# Patient Record
Sex: MALE | Race: WHITE | NOT HISPANIC OR LATINO | Employment: OTHER | URBAN - METROPOLITAN AREA
[De-identification: names, ages, dates, MRNs, and addresses within clinical notes are randomized per-mention and may not be internally consistent; named-entity substitution may affect disease eponyms.]

---

## 2022-04-10 ENCOUNTER — HISTORICAL (OUTPATIENT)
Dept: ADMINISTRATIVE | Facility: HOSPITAL | Age: 52
End: 2022-04-10
Payer: OTHER GOVERNMENT

## 2022-04-30 VITALS
BODY MASS INDEX: 32.49 KG/M2 | SYSTOLIC BLOOD PRESSURE: 132 MMHG | WEIGHT: 195 LBS | HEIGHT: 65 IN | DIASTOLIC BLOOD PRESSURE: 84 MMHG

## 2022-05-20 DIAGNOSIS — R56.9 SEIZURE: Primary | ICD-10-CM

## 2022-06-21 RX ORDER — LISINOPRIL 40 MG/1
40 TABLET ORAL EVERY MORNING
COMMUNITY

## 2022-06-21 RX ORDER — LAMOTRIGINE 100 MG/1
100 TABLET ORAL DAILY
COMMUNITY
End: 2022-09-08

## 2022-06-21 RX ORDER — TERBINAFINE HYDROCHLORIDE 250 MG/1
250 TABLET ORAL DAILY
COMMUNITY
End: 2022-09-08

## 2022-06-21 RX ORDER — LAMOTRIGINE 200 MG/1
200 TABLET ORAL 2 TIMES DAILY
COMMUNITY
End: 2023-06-01 | Stop reason: SDUPTHER

## 2022-06-21 RX ORDER — SILDENAFIL 100 MG/1
100 TABLET, FILM COATED ORAL DAILY PRN
COMMUNITY
End: 2022-09-08

## 2022-06-21 RX ORDER — LAMOTRIGINE 25 MG/1
25 TABLET ORAL DAILY
COMMUNITY
End: 2022-09-08

## 2022-06-21 RX ORDER — TADALAFIL 20 MG/1
20 TABLET ORAL DAILY
COMMUNITY
End: 2022-09-08

## 2022-06-21 RX ORDER — SIMVASTATIN 80 MG/1
80 TABLET, FILM COATED ORAL NIGHTLY
COMMUNITY

## 2022-06-22 ENCOUNTER — ANESTHESIA EVENT (OUTPATIENT)
Dept: ENDOSCOPY | Facility: HOSPITAL | Age: 52
End: 2022-06-22
Payer: OTHER GOVERNMENT

## 2022-06-22 ENCOUNTER — ANESTHESIA (OUTPATIENT)
Dept: ENDOSCOPY | Facility: HOSPITAL | Age: 52
End: 2022-06-22
Payer: OTHER GOVERNMENT

## 2022-06-22 ENCOUNTER — HOSPITAL ENCOUNTER (OUTPATIENT)
Facility: HOSPITAL | Age: 52
Discharge: HOME | End: 2022-06-22
Attending: INTERNAL MEDICINE | Admitting: INTERNAL MEDICINE
Payer: OTHER GOVERNMENT

## 2022-06-22 VITALS
TEMPERATURE: 98 F | SYSTOLIC BLOOD PRESSURE: 140 MMHG | BODY MASS INDEX: 31.34 KG/M2 | RESPIRATION RATE: 22 BRPM | HEART RATE: 65 BPM | DIASTOLIC BLOOD PRESSURE: 99 MMHG | HEIGHT: 66 IN | WEIGHT: 195 LBS | OXYGEN SATURATION: 96 %

## 2022-06-22 DIAGNOSIS — R19.5 FECAL OCCULT BLOOD TEST POSITIVE: ICD-10-CM

## 2022-06-22 PROCEDURE — 00811 ANES LWR INTST NDSC NOS: CPT | Performed by: INTERNAL MEDICINE

## 2022-06-22 PROCEDURE — 37000008 HC ANESTHESIA 1ST 15 MINUTES: Performed by: INTERNAL MEDICINE

## 2022-06-22 PROCEDURE — 37000009 HC ANESTHESIA EA ADD 15 MINS: Performed by: INTERNAL MEDICINE

## 2022-06-22 PROCEDURE — 63600175 PHARM REV CODE 636 W HCPCS: Performed by: NURSE ANESTHETIST, CERTIFIED REGISTERED

## 2022-06-22 PROCEDURE — 25000003 PHARM REV CODE 250: Performed by: NURSE ANESTHETIST, CERTIFIED REGISTERED

## 2022-06-22 PROCEDURE — 45385 COLONOSCOPY W/LESION REMOVAL: CPT | Performed by: INTERNAL MEDICINE

## 2022-06-22 PROCEDURE — 27201423 OPTIME MED/SURG SUP & DEVICES STERILE SUPPLY: Performed by: INTERNAL MEDICINE

## 2022-06-22 RX ORDER — ONDANSETRON 2 MG/ML
4 INJECTION INTRAMUSCULAR; INTRAVENOUS DAILY PRN
Status: DISCONTINUED | OUTPATIENT
Start: 2022-06-22 | End: 2022-06-22 | Stop reason: HOSPADM

## 2022-06-22 RX ORDER — PROPOFOL 10 MG/ML
VIAL (ML) INTRAVENOUS
Status: DISCONTINUED | OUTPATIENT
Start: 2022-06-22 | End: 2022-06-22

## 2022-06-22 RX ORDER — PROPOFOL 10 MG/ML
VIAL (ML) INTRAVENOUS
Status: COMPLETED
Start: 2022-06-22 | End: 2022-06-22

## 2022-06-22 RX ORDER — SODIUM CHLORIDE 0.9 % (FLUSH) 0.9 %
10 SYRINGE (ML) INJECTION
Status: CANCELLED | OUTPATIENT
Start: 2022-06-22

## 2022-06-22 RX ORDER — FAMOTIDINE 20 MG/1
40 TABLET, FILM COATED ORAL NIGHTLY
COMMUNITY

## 2022-06-22 RX ORDER — SODIUM CHLORIDE, SODIUM GLUCONATE, SODIUM ACETATE, POTASSIUM CHLORIDE AND MAGNESIUM CHLORIDE 30; 37; 368; 526; 502 MG/100ML; MG/100ML; MG/100ML; MG/100ML; MG/100ML
1000 INJECTION, SOLUTION INTRAVENOUS CONTINUOUS
Status: CANCELLED | OUTPATIENT
Start: 2022-06-22 | End: 2022-07-22

## 2022-06-22 RX ORDER — SODIUM CHLORIDE, SODIUM GLUCONATE, SODIUM ACETATE, POTASSIUM CHLORIDE AND MAGNESIUM CHLORIDE 30; 37; 368; 526; 502 MG/100ML; MG/100ML; MG/100ML; MG/100ML; MG/100ML
1000 INJECTION, SOLUTION INTRAVENOUS CONTINUOUS
Status: DISCONTINUED | OUTPATIENT
Start: 2022-06-22 | End: 2022-06-22 | Stop reason: HOSPADM

## 2022-06-22 RX ADMIN — SODIUM CHLORIDE, SODIUM GLUCONATE, SODIUM ACETATE, POTASSIUM CHLORIDE AND MAGNESIUM CHLORIDE: 526; 502; 368; 37; 30 INJECTION, SOLUTION INTRAVENOUS at 02:06

## 2022-06-22 RX ADMIN — PROPOFOL 240 MG: 10 INJECTION, EMULSION INTRAVENOUS at 02:06

## 2022-06-22 RX ADMIN — PROPOFOL 100 MG: 10 INJECTION, EMULSION INTRAVENOUS at 02:06

## 2022-06-22 NOTE — H&P
[facility] Endoscopy  Gastroenterology  H&P    Patient Name: Sebas Garcia  MRN: [id number]  Admission Date: 6/22/2022  Code Status: No Order    Attending Provider: Moisés Benitez MD   Primary Care Physician: Primary Doctor No  Principal Problem:<principal problem not specified>    Subjective:     History of Present Illness: Here for outpatient colonoscopy for positive fecal occult blood testing.     Past Medical History:   Diagnosis Date    Alcohol abuse     Dysphagia     High cholesterol     Hypertension     Obesity, unspecified     Seizures        Past Surgical History:   Procedure Laterality Date    APPENDECTOMY      CLAVICLE SURGERY      CRANIOTOMY      OPEN REDUCTION AND INTERNAL FIXATION (ORIF) OF INJURY OF THUMB         Review of patient's allergies indicates:  No Known Allergies  Family History    None       Tobacco Use    Smoking status: Never Smoker    Smokeless tobacco: Never Used   Substance and Sexual Activity    Alcohol use: Yes    Drug use: Never    Sexual activity: Not on file     Review of Systems   Constitutional: Negative for chills and fever.   HENT: Negative for mouth sores.    Eyes: Negative for visual disturbance.   Respiratory: Negative for cough and shortness of breath.    Cardiovascular: Negative for chest pain.   Gastrointestinal: Negative for abdominal pain, constipation, diarrhea, nausea and vomiting.   Endocrine: Negative for cold intolerance and heat intolerance.   Genitourinary: Negative for frequency and urgency.   Musculoskeletal: Negative for back pain.   Skin: Negative for rash.   Neurological: Negative for headaches.   Psychiatric/Behavioral: Negative for agitation and behavioral problems.     Objective:     Vital Signs (Most Recent):  Temp: 97.2 °F (36.2 °C) (06/22/22 1350)  Pulse: 65 (06/22/22 1350)  Resp: 13 (06/22/22 1350)  BP: (!) 139/94 (06/22/22 1350)  SpO2: 96 % (06/22/22 1350) Vital Signs (24h Range):  Temp:  [97.2 °F (36.2 °C)]  97.2 °F (36.2 °C)  Pulse:  [65] 65  Resp:  [13] 13  SpO2:  [96 %] 96 %  BP: (139)/(94) 139/94     Weight: 88.5 kg (195 lb) (06/21/22 1539)  Body mass index is 31.47 kg/m².    No intake or output data in the 24 hours ending 06/22/22 1435    Lines/Drains/Airways     Peripheral Intravenous Line  Duration                Peripheral IV - Single Lumen 06/22/22 1348 22 G Left Antecubital <1 day                Physical Exam  Constitutional:       General: He is not in acute distress.     Appearance: He is well-developed. He is not diaphoretic.   HENT:      Head: Normocephalic and atraumatic.      Right Ear: External ear normal.      Left Ear: External ear normal.      Nose: Nose normal.   Eyes:      General: No scleral icterus.        Right eye: No discharge.         Left eye: No discharge.      Conjunctiva/sclera: Conjunctivae normal.   Cardiovascular:      Rate and Rhythm: Normal rate and regular rhythm.      Heart sounds: No murmur heard.    No friction rub.   Pulmonary:      Effort: Pulmonary effort is normal. No respiratory distress.      Breath sounds: Normal breath sounds. No wheezing.   Abdominal:      General: Bowel sounds are normal. There is no distension.      Palpations: Abdomen is soft.      Tenderness: There is no abdominal tenderness.   Musculoskeletal:         General: No deformity.      Cervical back: Normal range of motion and neck supple.   Skin:     General: Skin is warm.   Neurological:      Mental Status: He is alert and oriented to person, place, and time.   Psychiatric:         Behavior: Behavior normal.         Significant Labs:  None    Significant Imaging:  Imaging results within the past 24 hours have been reviewed.    Assessment/Plan:     There are no hospital problems to display for this patient.    Procedure risks and benefits were discussed in detail today and an opportunity to answer patient/family questions was provided.      Moisés Benitez MD  Gastroenterology  [facility]  - [facility] Endoscopy

## 2022-06-22 NOTE — TRANSFER OF CARE
"Anesthesia Transfer of Care Note    Patient: Sebas Garcia    Procedure(s) Performed: Procedure(s) (LRB):  COLON (N/A)    Patient location: PACU    Anesthesia Type: general    Transport from OR: Transported from OR on room air with adequate spontaneous ventilation    Post pain: adequate analgesia    Post assessment: no apparent anesthetic complications    Post vital signs: stable    Level of consciousness: responds to stimulation    Nausea/Vomiting: no nausea/vomiting    Complications: none    Transfer of care protocol was followed      Last vitals:   Visit Vitals  BP (!) 139/94 (BP Location: Right arm, Patient Position: Lying)   Pulse 65   Temp 36.2 °C (97.2 °F)   Resp 13   Ht 5' 6" (1.676 m)   Wt 88.5 kg (195 lb)   SpO2 96%   BMI 31.47 kg/m²     "

## 2022-06-22 NOTE — ANESTHESIA PREPROCEDURE EVALUATION
06/22/2022  Sebas Garcia is a 52 y.o., male with Past Medical History:  Alcohol abuse  Dysphagia  High cholesterol  Hypertension  Obesity, unspecified  Seizures    And Past Surgical History:  Appendectomy  Clavicle surgery  Craniotomy  Open reduction and internal fixation (orif) of injury of thumb    Here for colonoscopy due to age and fecal occult blood test postive.      Pre-op Assessment    I have reviewed the NPO Status.      Review of Systems      Physical Exam  General: Well nourished, Cooperative, Alert and Oriented    Airway:  Mallampati: II   Mouth Opening: Normal  TM Distance: Normal  Tongue: Normal  Neck ROM: Normal ROM    Dental:  Intact    Chest/Lungs:  Clear to auscultation, Normal Respiratory Rate    Heart:  Rate: Normal  Rhythm: Regular Rhythm        Anesthesia Plan  Type of Anesthesia, risks & benefits discussed:    Anesthesia Type: Gen Natural Airway  Intra-op Monitoring Plan: Standard ASA Monitors  Post Op Pain Control Plan: IV/PO Opioids PRN  Induction:  IV  Airway Plan: Direct  Informed Consent: Informed consent signed with the Patient and all parties understand the risks and agree with anesthesia plan.  All questions answered. Patient consented to blood products? No  ASA Score: 3  Day of Surgery Review of History & Physical: H&P Update referred to the surgeon/provider.  Anesthesia Plan Notes: Nasal cannula vs facemask supplemental oxygenation   For patients with MICHELLE/obesity, may consider SuperNoval Nasal CPAP      Ready For Surgery From Anesthesia Perspective.     .

## 2022-06-22 NOTE — PROVATION PATIENT INSTRUCTIONS
Discharge Summary/Instructions after an Endoscopic Procedure  Patient Name: Sebas Garcia  Patient MRN: [id number]  Patient YOB: 1970 Wednesday, June 22, 2022  Moisés Benitez MD  Dear patient,  As a result of recent federal legislation (The Federal Cures Act), you may   receive lab or pathology results from your procedure in your [facility]   account before your physician is able to contact you. Your physician or   their representative will relay the results to you with their   recommendations at their soonest availability.  Thank you,  RESTRICTIONS:  During your procedure today, you received medications for sedation.  These   medications may affect your judgment, balance and coordination.  Therefore,   for 24 hours, you have the following restrictions:   - DO NOT drive a car, operate machinery, make legal/financial decisions,   sign important papers or drink alcohol.    ACTIVITY:  Today: no heavy lifting, straining or running due to procedural   sedation/anesthesia.  The following day: return to full activity including work.  DIET:  Eat and drink normally unless instructed otherwise.     TREATMENT FOR COMMON SIDE EFFECTS:  - Mild abdominal pain, nausea, belching, bloating or excessive gas:  rest,   eat lightly and use a heating pad.  - Sore Throat: treat with throat lozenges and/or gargle with warm salt   water.  - Because air was used during the procedure, expelling large amounts of air   from your rectum or belching is normal.  - If a bowel prep was taken, you may not have a bowel movement for 1-3 days.    This is normal.  SYMPTOMS TO WATCH FOR AND REPORT TO YOUR PHYSICIAN:  1. Abdominal pain or bloating, other than gas cramps.  2. Chest pain.  3. Back pain.  4. Signs of infection such as: chills or fever occurring within 24 hours   after the procedure.  5. Rectal bleeding, which would show as bright red, maroon, or black stools.   (A tablespoon of blood from the rectum is not serious, especially  if   hemorrhoids are present.)  6. Vomiting.  7. Weakness or dizziness.  GO DIRECTLY TO THE NEAREST EMERGENCY ROOM IF YOU HAVE ANY OF THE FOLLOWING:      Difficulty breathing              Chills and/or fever over 101 F   Persistent vomiting and/or vomiting blood   Severe abdominal pain   Severe chest pain   Black, tarry stools   Bleeding- more than one tablespoon   Any other symptom or condition that you feel may need urgent attention  Your doctor recommends these additional instructions:  If any biopsies were taken, your doctors clinic will contact you in 1 to 2   weeks with any results.  - Patient has a contact number available for emergencies.  The signs and   symptoms of potential delayed complications were discussed with the   patient.  Return to normal activities tomorrow.  Written discharge   instructions were provided to the patient.   - Advance diet as tolerated today.   - Discharge patient to home (with escort).   - Await pathology results.   - Repeat colonoscopy in 5 years for surveillance.   - Telephone GI clinic for pathology results in 2 weeks.   - The findings and recommendations were discussed with the patient.  For questions, problems or results please call your physician - Moisés Benitez MD at Work:  [phone].  [facility], EMERGENCY ROOM PHONE NUMBER: [phone]  IF A COMPLICATION OR EMERGENCY SITUATION ARISES AND YOU ARE UNABLE TO REACH   YOUR PHYSICIAN - GO DIRECTLY TO THE EMERGENCY ROOM.  MD Moisés Nova MD  6/22/2022 3:10:12 PM  This report has been verified and signed electronically.  Dear patient,  As a result of recent federal legislation (The Federal Cures Act), you may   receive lab or pathology results from your procedure in your [facility]   account before your physician is able to contact you. Your physician or   their representative will relay the results to you with their   recommendations at their soonest availability.  Thank  you,  PROVATION

## 2022-06-23 NOTE — ANESTHESIA POSTPROCEDURE EVALUATION
Anesthesia Post Evaluation    Patient: Sebas Garcia    Procedure(s) Performed: Procedure(s) (LRB):  COLON (N/A)    Final Anesthesia Type: general      Patient location during evaluation: PACU  Patient participation: Yes- Able to Participate  Level of consciousness: awake and alert  Post-procedure vital signs: reviewed and stable  Pain management: adequate    PONV status at discharge: No PONV  Anesthetic complications: no      Cardiovascular status: hemodynamically stable  Respiratory status: unassisted and room air  Hydration status: euvolemic  Follow-up not needed.          Vitals Value Taken Time   /99 06/22/22 1540   Temp 36.5 °C (97.7 °F) 06/22/22 1521   Pulse 65 06/22/22 1540   Resp 22 06/22/22 1540   SpO2 96 % 06/22/22 1540         No case tracking events are documented in the log.      Pain/Vaibhav Score: Vaibhav Score: 10 (6/22/2022  3:41 PM)

## 2022-06-24 LAB
ESTROGEN SERPL-MCNC: NORMAL PG/ML
INSULIN SERPL-ACNC: NORMAL U[IU]/ML
LAB AP CLINICAL INFORMATION: NORMAL
LAB AP GROSS DESCRIPTION: NORMAL
LAB AP REPORT FOOTNOTES: NORMAL
T3RU NFR SERPL: NORMAL %

## 2022-09-08 ENCOUNTER — OFFICE VISIT (OUTPATIENT)
Dept: NEUROLOGY | Facility: CLINIC | Age: 52
End: 2022-09-08
Payer: OTHER GOVERNMENT

## 2022-09-08 VITALS
HEIGHT: 66 IN | WEIGHT: 195 LBS | BODY MASS INDEX: 31.34 KG/M2 | DIASTOLIC BLOOD PRESSURE: 92 MMHG | SYSTOLIC BLOOD PRESSURE: 132 MMHG

## 2022-09-08 DIAGNOSIS — G40.009 LOCALIZATION-RELATED (FOCAL) (PARTIAL) IDIOPATHIC EPILEPSY AND EPILEPTIC SYNDROMES WITH SEIZURES OF LOCALIZED ONSET, NOT INTRACTABLE, WITHOUT STATUS EPILEPTICUS: Primary | ICD-10-CM

## 2022-09-08 DIAGNOSIS — R55: ICD-10-CM

## 2022-09-08 DIAGNOSIS — S06.9X9D TRAUMATIC BRAIN INJURY WITH LOSS OF CONSCIOUSNESS, SUBSEQUENT ENCOUNTER: ICD-10-CM

## 2022-09-08 DIAGNOSIS — R56.9 SEIZURE: ICD-10-CM

## 2022-09-08 DIAGNOSIS — M54.12 CERVICAL RADICULOPATHY: ICD-10-CM

## 2022-09-08 PROBLEM — S06.9X9A TRAUMATIC BRAIN INJURY WITH LOSS OF CONSCIOUSNESS: Status: ACTIVE | Noted: 2022-09-08

## 2022-09-08 PROCEDURE — 99215 PR OFFICE/OUTPT VISIT, EST, LEVL V, 40-54 MIN: ICD-10-PCS | Mod: S$PBB,,, | Performed by: SPECIALIST

## 2022-09-08 PROCEDURE — 99215 OFFICE O/P EST HI 40 MIN: CPT | Mod: S$PBB,,, | Performed by: SPECIALIST

## 2022-09-08 PROCEDURE — 99999 PR PBB SHADOW E&M-EST. PATIENT-LVL III: ICD-10-PCS | Mod: PBBFAC,,, | Performed by: SPECIALIST

## 2022-09-08 PROCEDURE — 99213 OFFICE O/P EST LOW 20 MIN: CPT | Mod: PBBFAC | Performed by: SPECIALIST

## 2022-09-08 PROCEDURE — 99999 PR PBB SHADOW E&M-EST. PATIENT-LVL III: CPT | Mod: PBBFAC,,, | Performed by: SPECIALIST
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
## 2022-09-09 ENCOUNTER — LAB VISIT (OUTPATIENT)
Dept: LAB | Facility: HOSPITAL | Age: 52
End: 2022-09-09
Attending: SPECIALIST
Payer: OTHER GOVERNMENT

## 2022-09-09 DIAGNOSIS — S06.9X9D TRAUMATIC BRAIN INJURY WITH LOSS OF CONSCIOUSNESS, SUBSEQUENT ENCOUNTER: ICD-10-CM

## 2022-09-09 DIAGNOSIS — R56.9 SEIZURE: ICD-10-CM

## 2022-09-09 DIAGNOSIS — R55: ICD-10-CM

## 2022-09-09 DIAGNOSIS — G40.009 LOCALIZATION-RELATED (FOCAL) (PARTIAL) IDIOPATHIC EPILEPSY AND EPILEPTIC SYNDROMES WITH SEIZURES OF LOCALIZED ONSET, NOT INTRACTABLE, WITHOUT STATUS EPILEPTICUS: ICD-10-CM

## 2022-09-09 PROCEDURE — 36415 COLL VENOUS BLD VENIPUNCTURE: CPT

## 2022-09-09 PROCEDURE — 80175 DRUG SCREEN QUAN LAMOTRIGINE: CPT

## 2022-09-10 LAB — LAMOTRIGINE SERPL-MCNC: 7.1 MCG/ML (ref 2.5–15)

## 2022-09-12 ENCOUNTER — TELEPHONE (OUTPATIENT)
Dept: NEUROLOGY | Facility: CLINIC | Age: 52
End: 2022-09-12
Payer: OTHER GOVERNMENT

## 2022-09-12 NOTE — TELEPHONE ENCOUNTER
----- Message from Connor Troncoso MD sent at 9/12/2022  9:19 AM CDT -----  Please call patient with attached result(s). Lamictal blood level  good  no new orders

## 2022-12-28 ENCOUNTER — ANESTHESIA EVENT (OUTPATIENT)
Dept: SURGERY | Facility: HOSPITAL | Age: 52
End: 2022-12-28
Payer: OTHER GOVERNMENT

## 2022-12-28 NOTE — ANESTHESIA PREPROCEDURE EVALUATION
12/28/2022  Sebas Garcia is a 52 y.o., male.      Pre-op Assessment    I have reviewed the Patient Summary Reports.     I have reviewed the Nursing Notes. I have reviewed the NPO Status.   I have reviewed the Medications.     Review of Systems  Anesthesia Hx:  Denies Family Hx of Anesthesia complications.   Denies Personal Hx of Anesthesia complications.   Social:  Non-Smoker, Alcohol Use    Hematology/Oncology:  Hematology Normal   Oncology Normal     EENT/Dental:EENT/Dental Normal   Cardiovascular:   Hypertension, well controlled    Pulmonary:  Pulmonary Normal    Hepatic/GI:  Hepatic/GI Normal    Musculoskeletal:  Musculoskeletal Normal    Neurological:   Neuromuscular Disease, Seizures, well controlled    Endocrine:  Endocrine Normal    Dermatological:  Skin Normal    Psych:  Psychiatric Normal           Physical Exam  General: Cooperative, Alert and Oriented    Airway:  Mallampati: II   Mouth Opening: Normal  TM Distance: Normal  Tongue: Normal  Neck ROM: Normal ROM    Dental:  Intact        Anesthesia Plan  Type of Anesthesia, risks & benefits discussed:    Anesthesia Type: MAC  Intra-op Monitoring Plan: Standard ASA Monitors  Post Op Pain Control Plan: multimodal analgesia  Induction:  IV  Informed Consent: Informed consent signed with the Patient and all parties understand the risks and agree with anesthesia plan.  All questions answered. Patient consented to blood products? Yes  ASA Score: 3    Ready For Surgery From Anesthesia Perspective.     .

## 2022-12-29 RX ORDER — GABAPENTIN 100 MG/1
100 CAPSULE ORAL 2 TIMES DAILY
COMMUNITY
End: 2024-03-01

## 2022-12-30 ENCOUNTER — ANESTHESIA (OUTPATIENT)
Dept: SURGERY | Facility: HOSPITAL | Age: 52
End: 2022-12-30
Payer: OTHER GOVERNMENT

## 2022-12-30 ENCOUNTER — HOSPITAL ENCOUNTER (OUTPATIENT)
Facility: HOSPITAL | Age: 52
Discharge: HOME | End: 2022-12-30
Attending: ANESTHESIOLOGY | Admitting: ANESTHESIOLOGY
Payer: OTHER GOVERNMENT

## 2022-12-30 VITALS
HEART RATE: 66 BPM | BODY MASS INDEX: 31.36 KG/M2 | HEIGHT: 66 IN | TEMPERATURE: 98 F | DIASTOLIC BLOOD PRESSURE: 91 MMHG | WEIGHT: 195.13 LBS | OXYGEN SATURATION: 98 % | SYSTOLIC BLOOD PRESSURE: 152 MMHG

## 2022-12-30 DIAGNOSIS — M54.16 LUMBAR RADICULITIS: ICD-10-CM

## 2022-12-30 PROCEDURE — 62323 NJX INTERLAMINAR LMBR/SAC: CPT | Performed by: ANESTHESIOLOGY

## 2022-12-30 PROCEDURE — 37000008 HC ANESTHESIA 1ST 15 MINUTES: Performed by: ANESTHESIOLOGY

## 2022-12-30 PROCEDURE — 01992 ANES DX/THER NRV BLK&INJ PRN: CPT | Performed by: ANESTHESIOLOGY

## 2022-12-30 PROCEDURE — 63600175 PHARM REV CODE 636 W HCPCS: Performed by: ANESTHESIOLOGY

## 2022-12-30 PROCEDURE — 25000003 PHARM REV CODE 250: Performed by: ANESTHESIOLOGY

## 2022-12-30 PROCEDURE — 63600175 PHARM REV CODE 636 W HCPCS: Performed by: NURSE ANESTHETIST, CERTIFIED REGISTERED

## 2022-12-30 PROCEDURE — 25500020 PHARM REV CODE 255: Performed by: ANESTHESIOLOGY

## 2022-12-30 RX ORDER — METHYLPREDNISOLONE ACETATE 80 MG/ML
INJECTION, SUSPENSION INTRA-ARTICULAR; INTRALESIONAL; INTRAMUSCULAR; SOFT TISSUE
Status: DISCONTINUED | OUTPATIENT
Start: 2022-12-30 | End: 2022-12-30 | Stop reason: HOSPADM

## 2022-12-30 RX ORDER — BUPIVACAINE HYDROCHLORIDE 2.5 MG/ML
INJECTION, SOLUTION EPIDURAL; INFILTRATION; INTRACAUDAL
Status: DISCONTINUED | OUTPATIENT
Start: 2022-12-30 | End: 2022-12-30 | Stop reason: HOSPADM

## 2022-12-30 RX ORDER — FENTANYL CITRATE 50 UG/ML
INJECTION, SOLUTION INTRAMUSCULAR; INTRAVENOUS
Status: DISCONTINUED | OUTPATIENT
Start: 2022-12-30 | End: 2022-12-30

## 2022-12-30 RX ORDER — MIDAZOLAM HYDROCHLORIDE 1 MG/ML
INJECTION INTRAMUSCULAR; INTRAVENOUS
Status: DISCONTINUED | OUTPATIENT
Start: 2022-12-30 | End: 2022-12-30

## 2022-12-30 RX ORDER — SODIUM CHLORIDE, SODIUM LACTATE, POTASSIUM CHLORIDE, CALCIUM CHLORIDE 600; 310; 30; 20 MG/100ML; MG/100ML; MG/100ML; MG/100ML
INJECTION, SOLUTION INTRAVENOUS CONTINUOUS
Status: ACTIVE | OUTPATIENT
Start: 2022-12-30

## 2022-12-30 RX ADMIN — FENTANYL CITRATE 100 MCG: 50 INJECTION, SOLUTION INTRAMUSCULAR; INTRAVENOUS at 10:12

## 2022-12-30 RX ADMIN — SODIUM CHLORIDE, POTASSIUM CHLORIDE, SODIUM LACTATE AND CALCIUM CHLORIDE: 600; 310; 30; 20 INJECTION, SOLUTION INTRAVENOUS at 08:12

## 2022-12-30 RX ADMIN — MIDAZOLAM HYDROCHLORIDE 4 MG: 1 INJECTION, SOLUTION INTRAMUSCULAR; INTRAVENOUS at 10:12

## 2022-12-30 NOTE — DISCHARGE SUMMARY
[facility] - Periop Services  Discharge Note  Short Stay    Procedure(s) (LRB):  INJECTION, STEROID, SPINE, LUMBAR, EPIDURAL (ILESI L4-5) (N/A)      OUTCOME: Patient tolerated treatment/procedure well without complication and is now ready for discharge.    DISPOSITION: Home or Self Care    FINAL DIAGNOSIS:  Lumbar radiculopathy    FOLLOWUP: In clinic    DISCHARGE INSTRUCTIONS:  No discharge procedures on file.      Clinical Reference Documents Added to Patient Instructions         Document    EPIDURAL INJECTION (ENGLISH)            TIME SPENT ON DISCHARGE: 2 minutes

## 2022-12-30 NOTE — ANESTHESIA POSTPROCEDURE EVALUATION
Anesthesia Post Evaluation    Patient: Sebas Garcia    Procedure(s) Performed: Procedure(s) (LRB):  INJECTION, STEROID, SPINE, LUMBAR, EPIDURAL (ILESI L4-5) (N/A)    Final Anesthesia Type: MAC      Patient location during evaluation: OPS  Patient participation: Yes- Able to Participate  Level of consciousness: awake and alert  Post-procedure vital signs: reviewed and stable  Pain management: adequate  Airway patency: patent  MICHELLE mitigation strategies: Multimodal analgesia  PONV status at discharge: No PONV  Anesthetic complications: no      Cardiovascular status: hemodynamically stable  Respiratory status: unassisted, spontaneous ventilation and room air  Hydration status: euvolemic  Follow-up not needed.  Comments: Patient to bed per self          Vitals Value Taken Time   /94 12/30/22 0847   Temp 36.4 °C (97.5 °F) 12/30/22 0847   Pulse 63 12/30/22 0847   Resp 15 12/30/22 1035   SpO2 98 % 12/30/22 0847         No case tracking events are documented in the log.      Pain/Vaibhav Score: No data recorded

## 2022-12-30 NOTE — H&P
"Patient presenting for Procedure(s) (LRB):  INJECTION, STEROID, SPINE, LUMBAR, EPIDURAL (ILESI L4-5) (N/A)     Patient on Anti-coagulation No    No health changes since previous encounter    Past Medical History:   Diagnosis Date    Alcohol abuse     Cervical radiculopathy     Dysphagia     High cholesterol     Hypertension     Lumbar radiculopathy     Obesity, unspecified     Seizures      Past Surgical History:   Procedure Laterality Date    APPENDECTOMY      CLAVICLE SURGERY      COLONOSCOPY N/A 06/22/2022    Procedure: COLON;  Surgeon: Moisés Benitez MD;  Location: [facility] ENDOSCOPY;  Service: Gastroenterology;  Laterality: N/A;    CRANIOTOMY      INSERTION, PEG TUBE      OPEN REDUCTION AND INTERNAL FIXATION (ORIF) OF INJURY OF THUMB      PEG TUBE REMOVAL      TRACHEOSTOMY      TRACHEOSTOMY CLOSURE       Review of patient's allergies indicates:  No Known Allergies     No current facility-administered medications on file prior to encounter.     Current Outpatient Medications on File Prior to Encounter   Medication Sig Dispense Refill    famotidine (PEPCID) 20 MG tablet Take 40 mg by mouth every evening.      gabapentin (NEURONTIN) 100 MG capsule Take 100 mg by mouth 2 (two) times daily.      lamoTRIgine (LAMICTAL) 200 MG tablet Take 200 mg by mouth 2 (two) times daily.      lisinopriL (PRINIVIL,ZESTRIL) 40 MG tablet Take 40 mg by mouth every morning.      simvastatin (ZOCOR) 80 MG tablet Take 80 mg by mouth every evening.          PMHx, PSHx, Allergies, Medications reviewed in epic    ROS negative except pain complaints in HPI    OBJECTIVE:    BP (!) 153/94   Pulse 63   Temp 97.5 °F (36.4 °C) (Oral)   Ht 5' 5.98" (1.676 m)   Wt 88.5 kg (195 lb 1.7 oz)   SpO2 98%   BMI 31.51 kg/m²     PHYSICAL EXAMINATION:    GENERAL: Well appearing, in no acute distress, alert and oriented x3.  PSYCH:  Mood and affect appropriate.  SKIN: Skin color, texture, turgor normal, no rashes or lesions which will impact the " procedure.  CV: RRR with palpation of the radial artery.  PULM: No evidence of respiratory difficulty, symmetric chest rise. Clear to auscultation.  NEURO: Cranial nerves grossly intact.    Plan:    Proceed with procedure as planned Procedure(s) (LRB):  INJECTION, STEROID, SPINE, LUMBAR, EPIDURAL (ILESI L4-5) (N/A)    Coy Ferguson MD  12/30/2022

## 2023-03-09 ENCOUNTER — OFFICE VISIT (OUTPATIENT)
Dept: NEUROLOGY | Facility: CLINIC | Age: 53
End: 2023-03-09
Payer: OTHER GOVERNMENT

## 2023-03-09 VITALS
DIASTOLIC BLOOD PRESSURE: 92 MMHG | SYSTOLIC BLOOD PRESSURE: 132 MMHG | HEIGHT: 65 IN | WEIGHT: 195 LBS | BODY MASS INDEX: 32.49 KG/M2

## 2023-03-09 DIAGNOSIS — G40.009 LOCALIZATION-RELATED (FOCAL) (PARTIAL) IDIOPATHIC EPILEPSY AND EPILEPTIC SYNDROMES WITH SEIZURES OF LOCALIZED ONSET, NOT INTRACTABLE, WITHOUT STATUS EPILEPTICUS: Primary | ICD-10-CM

## 2023-03-09 PROCEDURE — 99999 PR PBB SHADOW E&M-EST. PATIENT-LVL III: ICD-10-PCS | Mod: PBBFAC,,, | Performed by: NURSE PRACTITIONER

## 2023-03-09 PROCEDURE — 99214 OFFICE O/P EST MOD 30 MIN: CPT | Mod: S$PBB,,, | Performed by: NURSE PRACTITIONER

## 2023-03-09 PROCEDURE — 99213 OFFICE O/P EST LOW 20 MIN: CPT | Mod: PBBFAC | Performed by: NURSE PRACTITIONER

## 2023-03-09 PROCEDURE — 99214 PR OFFICE/OUTPT VISIT, EST, LEVL IV, 30-39 MIN: ICD-10-PCS | Mod: S$PBB,,, | Performed by: NURSE PRACTITIONER

## 2023-03-09 PROCEDURE — 99999 PR PBB SHADOW E&M-EST. PATIENT-LVL III: CPT | Mod: PBBFAC,,, | Performed by: NURSE PRACTITIONER

## 2023-03-09 NOTE — PROGRESS NOTES
Neurology Follow up Note    Subjective:         Patient ID: Sebas Garcia is a 53 y.o. male.    Chief Complaint: follow up for seizures    HPI:            Remains sz free    Denies near syncopal or syncopal episodes; he does mention that in the past, these episodes usually occurred during the summer months.      mg BID    LTG trough level 09/2022  7.1    He is receiving disability benefits    ROS: as per HPI, otherwise pertinent systems review is negative          Past Medical History:   Diagnosis Date    Alcohol abuse     Cervical radiculopathy     Dysphagia     High cholesterol     Hypertension     Lumbar radiculopathy     Obesity, unspecified     Seizures        Past Surgical History:   Procedure Laterality Date    APPENDECTOMY      CLAVICLE SURGERY      COLONOSCOPY N/A 06/22/2022    Procedure: COLON;  Surgeon: Moisés Benitez MD;  Location: [facility] ENDOSCOPY;  Service: Gastroenterology;  Laterality: N/A;    CRANIOTOMY      EPIDURAL STEROID INJECTION INTO LUMBAR SPINE N/A 12/30/2022    Procedure: INJECTION, STEROID, SPINE, LUMBAR, EPIDURAL (ILESI L4-5);  Surgeon: Coy Ferguson MD;  Location: [facility] OR;  Service: Pain Management;  Laterality: N/A;    INSERTION, PEG TUBE      OPEN REDUCTION AND INTERNAL FIXATION (ORIF) OF INJURY OF THUMB      PEG TUBE REMOVAL      TRACHEOSTOMY      TRACHEOSTOMY CLOSURE         Family History   Problem Relation Age of Onset    Alzheimer's disease Mother     Liver disease Father        Social History     Socioeconomic History    Marital status:    Tobacco Use    Smoking status: Never    Smokeless tobacco: Never   Substance and Sexual Activity    Alcohol use: Yes     Comment: Rarely    Drug use: Never       Review of patient's allergies indicates:  No Known Allergies      Current Outpatient Medications:     famotidine (PEPCID) 20 MG tablet, Take 40 mg by mouth every evening., Disp: , Rfl:     gabapentin (NEURONTIN) 100 MG capsule, Take 100 mg by mouth 2 (two) times  "daily., Disp: , Rfl:     lamoTRIgine (LAMICTAL) 200 MG tablet, Take 200 mg by mouth 2 (two) times daily., Disp: , Rfl:     lisinopriL (PRINIVIL,ZESTRIL) 40 MG tablet, Take 40 mg by mouth every morning., Disp: , Rfl:     simvastatin (ZOCOR) 80 MG tablet, Take 80 mg by mouth every evening., Disp: , Rfl:   No current facility-administered medications for this visit.    Facility-Administered Medications Ordered in Other Visits:     lactated ringers infusion, , Intravenous, Continuous, Bk Don DO, Last Rate: 10 mL/hr at 12/30/22 0858, Restarted at 12/30/22 1034     Objective:      Exam:   BP (!) 132/92   Ht 5' 5" (1.651 m)   Wt 88.5 kg (195 lb)   BMI 32.45 kg/m²     Physical Exam  Vitals reviewed.   Constitutional:       Appearance: Normal appearance.      Accompanied by: alone  HENT:      Ears:      Comments: Hearing normal.  Eyes:      Extraocular Movements: Extraocular movements intact.      VF's nml     PERRLA  Cardiovascular:      Rate and Rhythm: Normal rate and regular rhythm.   Pulmonary:      Effort: Pulmonary effort is normal.      Breath sounds: Normal breath sounds.   Musculoskeletal:         General: Normal range of motion.   Skin:     General: Skin is warm and dry.   Neurological:      General: No focal deficit present.      Mental Status: He is alert and oriented to person, place, and time.      Gait unassisted and normal.  Psychiatric:         Mood and Affect: Mood normal.         Behavior: Behavior normal.         Assessment/Plan:     Problem List Items Addressed This Visit          Neuro    Localization-related (focal) (partial) idiopathic epilepsy and epileptic syndromes with seizures of localized onset, not intractable, without status epilepticus - Primary    Continue present management    Risks of recurrent seizure discussed. seizure precautions discussed. Pt aware that unlawful to drive in [place] until seizure free at least 6 months.    Do not swim in pool alone  Do not bathe in tub full of " water; shower preferred  Avoid medications such as Tramadol, Wellbutrin, Cipro, Levaquin  Avoid ladders/heights  Avoid binge drinking  Avoid sleep deprivation     Follow up in 6 months; I ask that he notify our office if he has a swz or syncopal episode         Jose Carlos Barton, MSN, APRN, AGAP-BC

## 2023-06-01 DIAGNOSIS — R56.9 SEIZURE: Primary | ICD-10-CM

## 2023-06-01 RX ORDER — LAMOTRIGINE 200 MG/1
200 TABLET ORAL 2 TIMES DAILY
Qty: 180 TABLET | Refills: 3 | Status: SHIPPED | OUTPATIENT
Start: 2023-06-01 | End: 2023-09-01 | Stop reason: SDUPTHER

## 2023-09-01 ENCOUNTER — OFFICE VISIT (OUTPATIENT)
Dept: NEUROLOGY | Facility: CLINIC | Age: 53
End: 2023-09-01
Payer: OTHER GOVERNMENT

## 2023-09-01 VITALS
BODY MASS INDEX: 32.49 KG/M2 | SYSTOLIC BLOOD PRESSURE: 118 MMHG | HEIGHT: 65 IN | DIASTOLIC BLOOD PRESSURE: 82 MMHG | WEIGHT: 195 LBS

## 2023-09-01 DIAGNOSIS — R56.9 SEIZURE: ICD-10-CM

## 2023-09-01 DIAGNOSIS — G40.009 LOCALIZATION-RELATED (FOCAL) (PARTIAL) IDIOPATHIC EPILEPSY AND EPILEPTIC SYNDROMES WITH SEIZURES OF LOCALIZED ONSET, NOT INTRACTABLE, WITHOUT STATUS EPILEPTICUS: Primary | ICD-10-CM

## 2023-09-01 PROCEDURE — 99999 PR PBB SHADOW E&M-EST. PATIENT-LVL III: CPT | Mod: PBBFAC,,, | Performed by: NURSE PRACTITIONER

## 2023-09-01 PROCEDURE — 99213 OFFICE O/P EST LOW 20 MIN: CPT | Mod: PBBFAC | Performed by: NURSE PRACTITIONER

## 2023-09-01 PROCEDURE — 99214 PR OFFICE/OUTPT VISIT, EST, LEVL IV, 30-39 MIN: ICD-10-PCS | Mod: S$PBB,,, | Performed by: NURSE PRACTITIONER

## 2023-09-01 PROCEDURE — 99214 OFFICE O/P EST MOD 30 MIN: CPT | Mod: S$PBB,,, | Performed by: NURSE PRACTITIONER

## 2023-09-01 PROCEDURE — 99999 PR PBB SHADOW E&M-EST. PATIENT-LVL III: ICD-10-PCS | Mod: PBBFAC,,, | Performed by: NURSE PRACTITIONER

## 2023-09-01 RX ORDER — LAMOTRIGINE 200 MG/1
200 TABLET ORAL 2 TIMES DAILY
Qty: 180 TABLET | Refills: 3 | Status: SHIPPED | OUTPATIENT
Start: 2023-09-01

## 2023-09-01 NOTE — PROGRESS NOTES
Neurology Follow up Note    Subjective:         Patient ID: Sebas Garcia is a 53 y.o. male.    Chief Complaint: 6 month sz f/u     HPI:            Pt reports no syncopal or sz episodes since last visit; taking Lamotrigine 200 mg BID. Reports couple episodes of dizziness when he was in the heat but resolved shortly after going inside.      mg BID    ROS: as per HPI, otherwise pertinent systems review is negative          Past Medical History:   Diagnosis Date    Alcohol abuse     Cervical radiculopathy     Dysphagia     High cholesterol     Hypertension     Lumbar radiculopathy     Obesity, unspecified     Seizures        Past Surgical History:   Procedure Laterality Date    APPENDECTOMY      CLAVICLE SURGERY      COLONOSCOPY N/A 06/22/2022    Procedure: COLON;  Surgeon: Moisés Benitez MD;  Location: [facility] END[facility]COPY;  Service: Gastroenterology;  Laterality: N/A;    CRANIOTOMY      EPIDURAL STEROID INJECTION INTO LUMBAR SPINE N/A 12/30/2022    Procedure: INJECTION, STEROID, SPINE, LUMBAR, EPIDURAL (ILESI L4-5);  Surgeon: Coy Ferguson MD;  Location: [facility] OR;  Service: Pain Management;  Laterality: N/A;    INSERTION, PEG TUBE      OPEN REDUCTION AND INTERNAL FIXATION (ORIF) OF INJURY OF THUMB      PEG TUBE REMOVAL      TRACHEOSTOMY      TRACHEOSTOMY CLOSURE         Family History   Problem Relation Age of Onset    Alzheimer's disease Mother     Liver disease Father        Social History     Socioeconomic History    Marital status:    Tobacco Use    Smoking status: Never    Smokeless tobacco: Never   Substance and Sexual Activity    Alcohol use: Yes     Comment: Rarely    Drug use: Never       Review of patient's allergies indicates:  No Known Allergies    Current Outpatient Medications   Medication Instructions    famotidine (PEPCID) 40 mg, Oral, Nightly    gabapentin (NEURONTIN) 100 mg, Oral, 2 times daily    lamoTRIgine (LAMICTAL) 200 mg, Oral, 2 times daily    lisinopriL (PRINIVIL,ZESTRIL)  "40 mg, Oral, Every morning    simvastatin (ZOCOR) 80 mg, Oral, Nightly       Objective:      Exam:   Visit Vitals  /82 (BP Location: Left arm, Patient Position: Sitting)   Ht 5' 5" (1.651 m)   Wt 88.5 kg (195 lb)   BMI 32.45 kg/m²       Physical Exam  Vitals reviewed.   Constitutional:       Appearance: Normal appearance.      Accompanied by: wife   HENT:      Ears:      Comments: Hearing normal.  Eyes:      Extraocular Movements: Extraocular movements intact.      VF's ok  Cardiovascular:      Rate and Rhythm: Normal rate and regular rhythm.   Pulmonary:      Effort: Pulmonary effort is normal.      Breath sounds: Normal breath sounds.   Musculoskeletal:         General: Normal range of motion.   Skin:     General: Skin is warm and dry.   Neurological:      General: No focal deficit present.      Mental Status: alert and oriented to person, place, and time.      Speech: nml     Face: symmetric; tongue midline; cheek puff nml     Motor: nonlateralizing     Coordination: F to N ok, no dysmetria     Tremor: none     Gait: unassisted and normal.  Psychiatric:         Mood and Affect: Mood normal.         Behavior: Behavior normal.         Assessment/Plan:     Localization-related (focal) (partial) idiopathic epilepsy and epileptic syndromes with seizures of localized onset, not intractable, without status epilepticus    Continue the Lamictal 200 mg every morning and every night     Check LTG trough level     Risks of recurrent seizure discussed. seizure precautions discussed. Pt aware that unlawful to drive in [place] until seizure free at least 6 months.    Do not swim in pool alone  Do not bathe in tub full of water; shower preferred  Avoid medications such as Tramadol, Wellbutrin, Cipro, Levaquin  Avoid ladders/heights  Avoid binge drinking  Avoid sleep deprivation     Follow up in about 6 months (around 3/1/2024), or if symptoms worsen or fail to improve, for Epilepsy.      Jose Carlos Barton, MSN, APRN, " AGACNP-BC

## 2024-03-01 ENCOUNTER — OFFICE VISIT (OUTPATIENT)
Dept: NEUROLOGY | Facility: CLINIC | Age: 54
End: 2024-03-01
Payer: OTHER GOVERNMENT

## 2024-03-01 VITALS
DIASTOLIC BLOOD PRESSURE: 96 MMHG | BODY MASS INDEX: 32.49 KG/M2 | SYSTOLIC BLOOD PRESSURE: 138 MMHG | WEIGHT: 195 LBS | HEIGHT: 65 IN

## 2024-03-01 DIAGNOSIS — G40.009 LOCALIZATION-RELATED (FOCAL) (PARTIAL) IDIOPATHIC EPILEPSY AND EPILEPTIC SYNDROMES WITH SEIZURES OF LOCALIZED ONSET, NOT INTRACTABLE, WITHOUT STATUS EPILEPTICUS: Primary | ICD-10-CM

## 2024-03-01 DIAGNOSIS — R42 DIZZINESS: ICD-10-CM

## 2024-03-01 PROCEDURE — 99999 PR PBB SHADOW E&M-EST. PATIENT-LVL III: CPT | Mod: PBBFAC,,, | Performed by: NURSE PRACTITIONER

## 2024-03-01 PROCEDURE — 99213 OFFICE O/P EST LOW 20 MIN: CPT | Mod: PBBFAC | Performed by: NURSE PRACTITIONER

## 2024-03-01 PROCEDURE — 99214 OFFICE O/P EST MOD 30 MIN: CPT | Mod: S$PBB,,, | Performed by: NURSE PRACTITIONER

## 2024-03-01 NOTE — PROGRESS NOTES
"  Neurology Follow up Note    Subjective:         Patient ID: Sebas Garcia is a 54 y.o. male.    Chief Complaint: F/U Sz.      HPI:            He denies seizures; he tells me his GF told him he had blank staring episode 6 months ago - she was talking to him and he was not answering her; he states he "wasn't listening to her; I was ignoring her"    I ask that he get LTG level last visit; he has not done  He reports [facility]"    Has dizziness when he stands from the floor when doing exercises. He has longstanding h/o vertigo - previous cardiac workup benign per notes review.       mg, 1 BID     MRI b 2020 - chr features; no acute findings    Denies weakness; does his own back exercises    ROS: as per HPI, otherwise pertinent systems review is negative          Past Medical History:   Diagnosis Date    Alcohol abuse     Cervical radiculopathy     Dysphagia     High cholesterol     Hypertension     Lumbar radiculopathy     Obesity, unspecified     Seizures        Past Surgical History:   Procedure Laterality Date    APPENDECTOMY      CLAVICLE SURGERY      COLONOSCOPY N/A 06/22/2022    Procedure: COLON;  Surgeon: Moisés Benitez MD;  Location: [facility] ENDOSCOPY;  Service: Gastroenterology;  Laterality: N/A;    CRANIOTOMY      EPIDURAL STEROID INJECTION INTO LUMBAR SPINE N/A 12/30/2022    Procedure: INJECTION, STEROID, SPINE, LUMBAR, EPIDURAL (ILESI L4-5);  Surgeon: Coy Ferguson MD;  Location: [facility] OR;  Service: Pain Management;  Laterality: N/A;    INSERTION, PEG TUBE      OPEN REDUCTION AND INTERNAL FIXATION (ORIF) OF INJURY OF THUMB      PEG TUBE REMOVAL      TRACHEOSTOMY      TRACHEOSTOMY CLOSURE         Family History   Problem Relation Age of Onset    Alzheimer's disease Mother     Liver disease Father        Social History     Socioeconomic History    Marital status:    Tobacco Use    Smoking status: Never    Smokeless tobacco: Never   Substance and " "Sexual Activity    Alcohol use: Yes     Comment: Rarely    Drug use: Never       Review of patient's allergies indicates:  No Known Allergies    Current Outpatient Medications   Medication Instructions    famotidine (PEPCID) 40 mg, Oral, Nightly    lamoTRIgine (LAMICTAL) 200 mg, Oral, 2 times daily    lisinopriL (PRINIVIL,ZESTRIL) 40 mg, Oral, Every morning    simvastatin (ZOCOR) 80 mg, Oral, Nightly       Objective:      Exam:   Visit Vitals  BP (!) 138/96   Ht 5' 5" (1.651 m)   Wt 88.5 kg (195 lb)   BMI 32.45 kg/m²       Physical Exam  Vitals reviewed.   Constitutional:       Appearance: Normal appearance.      Accompanied by: wife   HENT:      Ears:      Comments: Hearing normal.  Eyes:      Extraocular Movements: Extraocular movements intact.      VF's ok  Cardiovascular:      Rate and Rhythm: Normal rate and regular rhythm.   Pulmonary:      Effort: Pulmonary effort is normal.      Breath sounds: Normal breath sounds.   Musculoskeletal:         General: Normal range of motion.   Skin:     General: Skin is warm and dry.   Neurological:      General: No focal deficit present.      Mental Status: alert and oriented to person, place, and time.      Speech: nml     Face: symmetric; tongue midline; cheek puff nml     Motor: nonlateralizing     Coordination: F to N ok, no dysmetria     Tremor: none     Gait: unassisted and normal.  Psychiatric:         Mood and Affect: Mood normal.         Behavior: Behavior normal.         Assessment/Plan:   1. Localization-related (focal) (partial) idiopathic epilepsy and epileptic syndromes with seizures of localized onset, not intractable, without status epilepticus    I ask that he get the LTG trough level as soon as possible [in AM prior to taking the LTG dose]    [facility] - ok per his report    Continue the  mg every morning and at bed time; consider an increase contingent upon LTG level     Risks of recurrent seizure discussed. seizure " precautions discussed. Pt aware that unlawful to drive in [place] until seizure free at least 6 months.    Do not swim in pool alone  Do not bathe in tub full of water; shower preferred  Avoid medications such as Tramadol, Wellbutrin, Cipro, Levaquin  Avoid ladders/heights  Avoid binge drinking  Avoid sleep deprivation     Prior AED: Keppra    2. Dizziness  Prior cardiac workup had been ok  He will speak to cardiologist about HTN meds  I ask that he avoid dehydration    I ask that he make position changes slowly       Follow up in about 6 months (around 9/1/2024). With Dr. Aba Barton, MSN, APRN, AGACNP-BC

## 2024-03-06 ENCOUNTER — LAB VISIT (OUTPATIENT)
Dept: LAB | Facility: HOSPITAL | Age: 54
End: 2024-03-06
Attending: SPECIALIST
Payer: OTHER GOVERNMENT

## 2024-03-06 DIAGNOSIS — G40.009 LOCALIZATION-RELATED (FOCAL) (PARTIAL) IDIOPATHIC EPILEPSY AND EPILEPTIC SYNDROMES WITH SEIZURES OF LOCALIZED ONSET, NOT INTRACTABLE, WITHOUT STATUS EPILEPTICUS: ICD-10-CM

## 2024-03-06 DIAGNOSIS — R42 DIZZINESS: ICD-10-CM

## 2024-03-06 PROCEDURE — 80175 DRUG SCREEN QUAN LAMOTRIGINE: CPT

## 2024-03-06 PROCEDURE — 36415 COLL VENOUS BLD VENIPUNCTURE: CPT

## 2024-03-07 LAB — LAMOTRIGINE SERPL-MCNC: 6.3 MCG/ML (ref 3–15)

## 2024-08-14 ENCOUNTER — OFFICE VISIT (OUTPATIENT)
Dept: NEUROLOGY | Facility: CLINIC | Age: 54
End: 2024-08-14
Payer: OTHER GOVERNMENT

## 2024-08-14 VITALS
WEIGHT: 190 LBS | SYSTOLIC BLOOD PRESSURE: 116 MMHG | DIASTOLIC BLOOD PRESSURE: 86 MMHG | HEIGHT: 65 IN | BODY MASS INDEX: 31.65 KG/M2

## 2024-08-14 DIAGNOSIS — G40.009 LOCALIZATION-RELATED (FOCAL) (PARTIAL) IDIOPATHIC EPILEPSY AND EPILEPTIC SYNDROMES WITH SEIZURES OF LOCALIZED ONSET, NOT INTRACTABLE, WITHOUT STATUS EPILEPTICUS: Primary | ICD-10-CM

## 2024-08-14 DIAGNOSIS — S06.9X9D TRAUMATIC BRAIN INJURY WITH LOSS OF CONSCIOUSNESS, SUBSEQUENT ENCOUNTER: ICD-10-CM

## 2024-08-14 DIAGNOSIS — R56.9 SEIZURE: ICD-10-CM

## 2024-08-14 PROCEDURE — 99999 PR PBB SHADOW E&M-EST. PATIENT-LVL III: CPT | Mod: PBBFAC,,, | Performed by: SPECIALIST

## 2024-08-14 PROCEDURE — 99214 OFFICE O/P EST MOD 30 MIN: CPT | Mod: S$PBB,,, | Performed by: SPECIALIST

## 2024-08-14 PROCEDURE — 99213 OFFICE O/P EST LOW 20 MIN: CPT | Mod: PBBFAC | Performed by: SPECIALIST

## 2024-08-14 RX ORDER — LAMOTRIGINE 200 MG/1
200 TABLET ORAL 2 TIMES DAILY
Qty: 180 TABLET | Refills: 3 | Status: SHIPPED | OUTPATIENT
Start: 2024-08-14

## 2024-08-14 RX ORDER — OMEPRAZOLE 40 MG/1
40 CAPSULE, DELAYED RELEASE ORAL EVERY MORNING
COMMUNITY

## 2024-08-14 NOTE — PROGRESS NOTES
"  Subjective:         Patient ID: Sebas Garcia is a 54 y.o. male.    Chief Complaint: follow up  epilepsy/seizure / paroxysmal spells    HPI:           6 month sz f/u (Here for 6 month sz f/u//Pt reports no sz or sz like activity since last visit; taking Lamotrigine 200 mg BID. Lamotrigine level drawn)    When I inquired about his sleep he mentioned he just started cpap 3 mo ago and struggling     [facility]     Last poss seizure or syncopal spell:   aug 2022     3.11.2024 fell / slipped off a tractor   notes may also be on facesheet for HPI, ROS, and other sections       9.2020 [facility] my notes then:  Brain MRI shows evidence of a prior pressure monitor or ventriculoperitoneal shunt tract in the right prefrontal frontal junction. Overall however the brain MRI did not look bad. However, given the scar that is there and I think it is best for him to maintain on Keppra 500 mg twice daily perhaps indefinitely but for at least 5 years after this seizure. The seizures probably were on the basis of hyponatremia however with the prior brain injury he would be at increased risk of seizures going forward perhaps even with normal sodiums.           Current Outpatient Medications   Medication Instructions    famotidine (PEPCID) 40 mg, Oral, Nightly    lamoTRIgine (LAMICTAL) 200 mg, Oral, 2 times daily    lisinopriL (PRINIVIL,ZESTRIL) 40 mg, Oral, Every morning    omeprazole (PRILOSEC) 40 mg, Oral, Every morning    simvastatin (ZOCOR) 80 mg, Oral, Nightly     There are no discontinued medications.      Objective:      Exam  Visit Vitals  /86 (BP Location: Left arm, Patient Position: Sitting)   Ht 5' 5" (1.651 m)   Wt 86.2 kg (190 lb)   BMI 31.62 kg/m²       General:   If accompanied, by:_ Sadia   Heart__ RRR    Neurological    Speech: ok   vis fields: ok   EOMs: ok   Motor: ok   coord: F N ok   Gait: ok       Neuroimaging:  My comments:  prior comments see above     Prior EEG:     Labs:    meds:      __ " multiple issues/ diagnoses or problems [if not enumerated in note then discussed in encounter but chose to or failed to document]    complexity of data   __high _mod   __ images and reports reviewed:  __ hx obtained from family or accompaniment:   __other studies reviewed   __studies ordered __   __studies considered or discussed but not ordered __  __DDx discussed __    risks  __high _mod   _._ seizure disorder with risks of recurrent seizures and risk of injury or death ( possible or definite)      _._ potentially high risk meds (and/or) CNS medications which may cause medical or behavioral side effects  _._ fall risk  __ driving discussed   __ diagnosis unclear or DDx wide making risk uncertain to high  __other:      MDM/Medical Decision Making used for CPT choice based on above elements:  __high : _moderate   Despite no orders       Assessment/Plan:         ICD-10-CM ICD-9-CM   1. Localization-related (focal) (partial) idiopathic epilepsy and epileptic syndromes with seizures of localized onset, not intractable, without status epilepticus  G40.009 345.50   2. Traumatic brain injury with loss of consciousness, subsequent encounter  S06.9X9D V58.89     854.06     Other comments/ follow up:        Encouraged he stay on his lamotrigine   Refilled for one year  but beyond that ask that he get from his primary     Asked that he follow with his primary     can call here to return if needed     No orders of the defined types were placed in this encounter.          Seizure medications can be associated with certain side effects, including memory dysfunction or mood disorders. Excessive daytime sleepiness may occur sometimes leading to car crashes.  Abrupt stoppage of anticonvulsant medications can be medically troublesome.    Patient Instructions

## 2025-03-06 ENCOUNTER — OFFICE VISIT (OUTPATIENT)
Dept: NEUROLOGY | Facility: CLINIC | Age: 55
End: 2025-03-06
Payer: OTHER GOVERNMENT

## 2025-03-06 VITALS
SYSTOLIC BLOOD PRESSURE: 118 MMHG | WEIGHT: 195 LBS | BODY MASS INDEX: 32.49 KG/M2 | HEIGHT: 65 IN | DIASTOLIC BLOOD PRESSURE: 74 MMHG

## 2025-03-06 DIAGNOSIS — G40.009 LOCALIZATION-RELATED (FOCAL) (PARTIAL) IDIOPATHIC EPILEPSY AND EPILEPTIC SYNDROMES WITH SEIZURES OF LOCALIZED ONSET, NOT INTRACTABLE, WITHOUT STATUS EPILEPTICUS: Primary | ICD-10-CM

## 2025-03-06 DIAGNOSIS — R56.9 SEIZURE: ICD-10-CM

## 2025-03-06 PROCEDURE — 99999 PR PBB SHADOW E&M-EST. PATIENT-LVL III: CPT | Mod: PBBFAC,,, | Performed by: SPECIALIST

## 2025-03-06 PROCEDURE — 99213 OFFICE O/P EST LOW 20 MIN: CPT | Mod: PBBFAC | Performed by: SPECIALIST

## 2025-03-06 RX ORDER — LAMOTRIGINE 200 MG/1
200 TABLET ORAL 2 TIMES DAILY
Qty: 180 TABLET | Refills: 3 | Status: SHIPPED | OUTPATIENT
Start: 2025-03-06

## 2025-03-06 NOTE — PROGRESS NOTES
"  Subjective:         Patient ID: Sebas Garcia is a 55 y.o. male.    Chief Complaint: follow up  epilepsy/seizure / paroxysmal spells    HPI:           F/U Sz-TBI. (Unable to recall the last time he had a Sz. Pt [facility] Alpha Stem [facility] to [facility]. )    Last seizure: he cannot recall greater than one year ago less than 5 y ago   '96 TBI; hx hyponatr      notes may also be on facesheet for HPI, ROS, and other sections             Current Outpatient Medications   Medication Instructions    famotidine (PEPCID) 40 mg, Nightly    lamoTRIgine (LAMICTAL) 200 mg, Oral, 2 times daily    lisinopriL (PRINIVIL,ZESTRIL) 40 mg, Every morning    omeprazole (PRILOSEC) 40 mg, Every morning    simvastatin (ZOCOR) 80 mg, Nightly     Medications Discontinued During This Encounter   Medication Reason    lamoTRIgine (LAMICTAL) 200 MG tablet Reorder         Objective:      Exam  Visit Vitals  /74 (BP Location: Left arm, Patient Position: Sitting)   Ht 5' 5" (1.651 m)   Wt 88.5 kg (195 lb)   BMI 32.45 kg/m²       General:   If accompanied, by:_ n  heart__    Neurological    Speech: normal   vis fields:  EOMs:  motor  coord:   Gait: unassisted   Abrasion r forehead from trimming a tree       Neuroimaging:  My comments:     Prior EEG:     Labs:  3.24 LTG 6.3;     meds:          Assessment/Plan:         ICD-10-CM ICD-9-CM   1. Localization-related (focal) (partial) idiopathic epilepsy and epileptic syndromes with seizures of localized onset, not intractable, without status epilepticus  G40.009 345.50   2. Seizure  R56.9 780.39       Other comments/ follow up:      Cont pres mgmt   Aim fu one year Kristine     Medications Ordered This Encounter   Medications    lamoTRIgine (LAMICTAL) 200 MG tablet     Sig: Take 1 tablet (200 mg total) by mouth 2 (two) times daily.     Dispense:  180 tablet     Refill:  3         Seizure medications can be associated with certain side effects, including memory dysfunction or mood disorders. " Excessive daytime sleepiness may occur sometimes leading to car crashes. Abrupt stoppage of anticonvulsant medications can be medically troublesome.

## (undated) DEVICE — ELECTRODE REM POLYHESIVE II

## (undated) DEVICE — SYR DISP LL 5CC

## (undated) DEVICE — KIT SURGICAL COLON .25 1.1OZ

## (undated) DEVICE — TOWEL OR BLUE STRL 16X26 4/PK

## (undated) DEVICE — BANDAID HOT COLORS

## (undated) DEVICE — SET IV EXT GENERAL 4.9ML 30IN

## (undated) DEVICE — SOL IRRI STRL WATER 1000ML

## (undated) DEVICE — SNARE CAPTIFLEX 2.4X27MM 240CM

## (undated) DEVICE — TIP SUCTION YANKAUER

## (undated) DEVICE — KIT CANIST SUCTION 1200CC

## (undated) DEVICE — APPLICATOR CHLORAPREP ORN 26ML

## (undated) DEVICE — ADAPTER DUAL NSL LUER M-M 7FT

## (undated) DEVICE — BLOCK BLOX BITE DENT RIM 54FR

## (undated) DEVICE — CONTAINER SPECIMEN 4.5OZ

## (undated) DEVICE — UNDERPAD DISPOSABLE 30X30IN

## (undated) DEVICE — GLOVE PROTEXIS HYDROGEL SZ7.5

## (undated) DEVICE — GLOVE PROTEXIS HYDROGEL SZ6.5

## (undated) DEVICE — SYR EPILOR LUER-LOK LOR 7ML

## (undated) DEVICE — BAG BIO SPECIMEN W/POCKET 6X9

## (undated) DEVICE — COLLECTION SPECIMEN NEPTUNE

## (undated) DEVICE — CONTRAST ISOVUE M 300 15ML VIL

## (undated) DEVICE — TRAY SINGLE DOSE EPIDURAL